# Patient Record
Sex: FEMALE | Race: WHITE
[De-identification: names, ages, dates, MRNs, and addresses within clinical notes are randomized per-mention and may not be internally consistent; named-entity substitution may affect disease eponyms.]

---

## 2018-08-02 ENCOUNTER — HOSPITAL ENCOUNTER (OUTPATIENT)
Dept: HOSPITAL 72 - SUR | Age: 53
Discharge: HOME | End: 2018-08-02
Payer: COMMERCIAL

## 2018-08-02 VITALS — DIASTOLIC BLOOD PRESSURE: 81 MMHG | SYSTOLIC BLOOD PRESSURE: 140 MMHG

## 2018-08-02 VITALS — SYSTOLIC BLOOD PRESSURE: 120 MMHG | DIASTOLIC BLOOD PRESSURE: 79 MMHG

## 2018-08-02 VITALS — WEIGHT: 140 LBS | BODY MASS INDEX: 22.5 KG/M2 | HEIGHT: 66 IN

## 2018-08-02 VITALS — SYSTOLIC BLOOD PRESSURE: 135 MMHG | DIASTOLIC BLOOD PRESSURE: 70 MMHG

## 2018-08-02 VITALS — DIASTOLIC BLOOD PRESSURE: 78 MMHG | SYSTOLIC BLOOD PRESSURE: 129 MMHG

## 2018-08-02 VITALS — SYSTOLIC BLOOD PRESSURE: 133 MMHG | DIASTOLIC BLOOD PRESSURE: 86 MMHG

## 2018-08-02 VITALS — SYSTOLIC BLOOD PRESSURE: 127 MMHG | DIASTOLIC BLOOD PRESSURE: 78 MMHG

## 2018-08-02 VITALS — DIASTOLIC BLOOD PRESSURE: 71 MMHG | SYSTOLIC BLOOD PRESSURE: 136 MMHG

## 2018-08-02 VITALS — SYSTOLIC BLOOD PRESSURE: 118 MMHG | DIASTOLIC BLOOD PRESSURE: 70 MMHG

## 2018-08-02 VITALS — DIASTOLIC BLOOD PRESSURE: 82 MMHG | SYSTOLIC BLOOD PRESSURE: 129 MMHG

## 2018-08-02 VITALS — DIASTOLIC BLOOD PRESSURE: 78 MMHG | SYSTOLIC BLOOD PRESSURE: 128 MMHG

## 2018-08-02 VITALS — SYSTOLIC BLOOD PRESSURE: 132 MMHG | DIASTOLIC BLOOD PRESSURE: 68 MMHG

## 2018-08-02 VITALS — SYSTOLIC BLOOD PRESSURE: 131 MMHG | DIASTOLIC BLOOD PRESSURE: 76 MMHG

## 2018-08-02 DIAGNOSIS — F32.9: ICD-10-CM

## 2018-08-02 DIAGNOSIS — Z85.3: ICD-10-CM
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
## 2018-08-02 DIAGNOSIS — Z88.1: ICD-10-CM
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
## 2018-08-02 DIAGNOSIS — F41.9: ICD-10-CM

## 2018-08-02 DIAGNOSIS — Z88.0: ICD-10-CM

## 2018-08-02 DIAGNOSIS — T85.44XA: Primary | ICD-10-CM

## 2018-08-02 DIAGNOSIS — Z90.710: ICD-10-CM

## 2018-08-02 DIAGNOSIS — Z88.8: ICD-10-CM

## 2018-08-02 LAB
ADD MANUAL DIFF: NO
ANION GAP SERPL CALC-SCNC: 12 MMOL/L (ref 5–15)
APTT BLD: 28 SEC (ref 23–33)
BASOPHILS NFR BLD AUTO: 1.2 % (ref 0–2)
BUN SERPL-MCNC: 21 MG/DL (ref 7–18)
CALCIUM SERPL-MCNC: 9.3 MG/DL (ref 8.5–10.1)
CHLORIDE SERPL-SCNC: 101 MMOL/L (ref 98–107)
CO2 SERPL-SCNC: 27 MMOL/L (ref 21–32)
CREAT SERPL-MCNC: 0.8 MG/DL (ref 0.55–1.3)
EOSINOPHIL NFR BLD AUTO: 2 % (ref 0–3)
ERYTHROCYTE [DISTWIDTH] IN BLOOD BY AUTOMATED COUNT: 11.8 % (ref 11.6–14.8)
HCT VFR BLD CALC: 37.2 % (ref 37–47)
HGB BLD-MCNC: 12.3 G/DL (ref 12–16)
INR PPP: 1.1 (ref 0.9–1.1)
LYMPHOCYTES NFR BLD AUTO: 25.9 % (ref 20–45)
MCV RBC AUTO: 88 FL (ref 80–99)
MONOCYTES NFR BLD AUTO: 8.2 % (ref 1–10)
NEUTROPHILS NFR BLD AUTO: 62.7 % (ref 45–75)
PLATELET # BLD: 244 K/UL (ref 150–450)
POTASSIUM SERPL-SCNC: 4.2 MMOL/L (ref 3.5–5.1)
RBC # BLD AUTO: 4.23 M/UL (ref 4.2–5.4)
SODIUM SERPL-SCNC: 140 MMOL/L (ref 136–145)
WBC # BLD AUTO: 5.7 K/UL (ref 4.8–10.8)

## 2018-08-02 PROCEDURE — 80048 BASIC METABOLIC PNL TOTAL CA: CPT

## 2018-08-02 PROCEDURE — 94003 VENT MGMT INPAT SUBQ DAY: CPT

## 2018-08-02 PROCEDURE — 36415 COLL VENOUS BLD VENIPUNCTURE: CPT

## 2018-08-02 PROCEDURE — 94150 VITAL CAPACITY TEST: CPT

## 2018-08-02 PROCEDURE — 19370 REVJ PERI-IMPLT CAPSULE BRST: CPT

## 2018-08-02 PROCEDURE — 85730 THROMBOPLASTIN TIME PARTIAL: CPT

## 2018-08-02 PROCEDURE — 85610 PROTHROMBIN TIME: CPT

## 2018-08-02 PROCEDURE — 85025 COMPLETE CBC W/AUTO DIFF WBC: CPT

## 2018-08-02 NOTE — IMMEDIATE POST-OP EVALUATION
Immediate Post-Op Evalulation


Immediate Post-Op Evalulation


Procedure:  Right breast capsulotomy


Date of Evaluation:  Aug 2, 2018


Time of Evaluation:  10:06


IV Fluids:  700


Blood Products:  0


Estimated Blood Loss:  min


Urinary Output:  0


Blood Pressure Systolic:  136


Blood Pressure Diastolic:  71


Pulse Rate:  83


Respiratory Rate:  16


O2 Sat by Pulse Oximetry:  99


Temperature (Fahrenheit):  97.1


Pain Score (1-10):  0


Nausea:  No


Vomiting:  No


Complications


0


Patient Status:  awake, reacts, patent, none


Hydration Status:  adequate


Drug:  Clindamcyin 600mg


Given Within 1 Hr of Incision:  Yes


Time Given:  08:50











Cheryl Monge MD Aug 2, 2018 10:05

## 2018-08-02 NOTE — PRE-PROCEDURE NOTE/ATTESTATION
Pre-Procedure Note/Attestation


Complete Prior to Procedure


Planned Procedure:  right


Procedure Narrative:


encapsulation of right breast status post radiation for breast cancer





Indications for Procedure


Pre-Operative Diagnosis:


encapsulation right breast





Attestation


I attest that I discussed the nature of the procedure; its benefits; risks and 

complications; and alternatives (and the risks and benefits of such alternatives

), prior to the procedure, with the patient (or the patient's legal 

representative).





I attest that, if there was a reasonable possibility of needing a blood 

transfusion, the patient (or the patient's legal representative) was given the 

[place] Department of Health Services standardized written summary, pursuant 

to the Daryl Je[place] Blood Safety Act ([place] Health and Safety Code # 1645, as 

amended).





I attest that I re-evaluated the patient just prior to the surgery and that 

there has been no change in the patient's H&P, except as documented below:











Live Amaya MD Aug 2, 2018 08:08

## 2018-08-02 NOTE — ANETHESIA PREOPERATIVE EVAL
Anesthesia Pre-op PMH/ROS


General


Date of Evaluation:  Aug 2, 2018


Anesthesiologist:  [place]


ASA Score:  ASA 3


Mallampati Score


Class I : Soft palate, uvula, fauces, pillars visible


Class II: Soft palate, uvula, fauces visible


Class III: Soft palate, base of uvula visible


Class IV: Only hard plate visible


Mallampati Classification:  Class II


Surgeon:  Jose Luis


Diagnosis:  Right breast radiation


Surgical Procedure:  Right breast capsulotomy


Anesthesia History:  none


Family History:  no anesthesia problems


Allergies:  


Coded Allergies:  


     LEVOFLOXACIN (Verified  Allergy, Severe, 8/1/18)


 THROAT CLOSES UP,HIVES


     PENICILLINS (Verified  Allergy, Severe, 8/1/18)


 THROAT CLOSES UP,HIVES


     BACITRACIN (Verified  Allergy, Intermediate, 8/2/18)


 TOPICAL OINTMENT-ITCHY,HIVES


     NEOMYCIN (Verified  Allergy, Intermediate, 8/2/18)


 TOPICAL OINTMENT-ITCHY,HIVES


     PRAMOXINE (Verified  Allergy, Intermediate, 8/1/18)


 TOPICAL OINTMENT-ITCHY,HIVES


Uncoded Allergies:  


     CEPHALOSPO (Allergy, Severe, 8/1/18)


 THROAT CLOSES UP,HIVES


Medications:  see eMAR





Past Medical History


Cardiovascular:  Denies: HTN, CAD, MI, valve dz, arrhythmia, other


Pulmonary:  Denies: asthma, COPD, CLARISSA, other


Gastrointestinal/Genitourinary:  Denies: GERD, CRI, ESRD, other


Neurologic/Psychiatric:  Reports: depression/anxiety; 


   Denies: dementia, CVA, TIA, other


Endocrine:  Denies: DM, hypothyroidism, steroids, other


HEENT:  Denies: cataract (L), cataract (R), glaucoma, [place] (L), [place] (R), other


Hematology/Immune:  Reports: other - right breast cancer; 


   Denies: anemia, DVT, bleeding disorder


Musculoskeletal/Integumentary:  Denies: OA, RA, DJD, DDD, edema, other


PSxH Narrative:


Bilateral breast augmentation, right breast lumpectomy, tummy tuck, partial 

hysterectomy





Anesthesia Pre-op Phys. Exam


Physician Exam





Last Vital Signs








  Date Time  Temp Pulse Resp B/P (MAP) Pulse Ox O2 Delivery O2 Flow Rate FiO2


 


8/2/18 06:14 96.4 73 20 120/79 (93) 96   





 96.4       


 


8/2/18 06:12      Room Air  








Constitutional:  NAD


Cardiovascular:  RRR


Respiratory:  CTA





Airway Exam


Mallampati Score:  Class II


MO:  full


ROM:  full





Anesthesia Pre-op A/P


Labs





Hematology








Test


  8/2/18


06:35


 


White Blood Count


  5.7 K/UL


(4.8-10.8)


 


Red Blood Count


  4.23 M/UL


(4.20-5.40)


 


Hemoglobin


  12.3 G/DL


(12.0-16.0)


 


Hematocrit


  37.2 %


(37.0-47.0)


 


Mean Corpuscular Volume 88 FL (80-99)  


 


Mean Corpuscular Hemoglobin


  29.1 PG


(27.0-31.0)


 


Mean Corpuscular Hemoglobin


Concent 33.1 G/DL


(32.0-36.0)


 


Red Cell Distribution Width


  11.8 %


(11.6-14.8)


 


Platelet Count


  244 K/UL


(150-450)


 


Mean Platelet Volume


  7.1 FL


(6.5-10.1)


 


Neutrophils (%) (Auto)


  62.7 %


(45.0-75.0)


 


Lymphocytes (%) (Auto)


  25.9 %


(20.0-45.0)


 


Monocytes (%) (Auto)


  8.2 %


(1.0-10.0)


 


Eosinophils (%) (Auto)


  2.0 %


(0.0-3.0)


 


Basophils (%) (Auto)


  1.2 %


(0.0-2.0)








Coagulation








Test


  8/2/18


06:35


 


Prothrombin Time Pending  


 


Prothromb Time International


Ratio Pending  


 


 


Activated Partial


Thromboplast Time Pending  


 








Chemistry








Test


  8/2/18


06:35


 


Sodium Level


  140 MMOL/L


(136-145)


 


Potassium Level


  4.2 MMOL/L


(3.5-5.1)


 


Chloride Level


  101 MMOL/L


()


 


Carbon Dioxide Level


  27 MMOL/L


(21-32)


 


Anion Gap


  12 mmol/L


(5-15)


 


Blood Urea Nitrogen


  21 mg/dL


(7-18)  H


 


Creatinine


  0.8 MG/DL


(0.55-1.30)


 


Estimat Glomerular Filtration


Rate > 60 mL/min


(>60)


 


Glucose Level


  97 MG/DL


()


 


Calcium Level


  9.3 MG/DL


(8.5-10.1)











Studies


Pre-op Studies:  EKG - sr





Risk Assessment & Plan


Assessment:


ASA III


Plan:


GA


Status Change Before Surgery:  No





Pre-Antibiotics


Drug:  Cheryl Carpio MD Aug 2, 2018 07:18

## 2018-08-02 NOTE — HISTORY AND PHYSICAL
History & Physical (DB)


History & Physical


History & Physical





Chief Complaint: encapsulation of right breast status post breast aug below the 

muscle status post radation for breast cancer





Reason for Hospitalizto do open capsulotomy ofright breast


ation:  





History obtained from: Chart and Patient








HPI:  is a  year old female who presents with .











Past Medical History:   prior radation for breast cancer








Social History:negneg








Past Medical History:prior radation for breast cancer








Diagnosis:   encapsulation right breast with deformity and painbreast aug 

bilateral  4 weeks ago








Past Surgical History:status postbilateral  breast aug








Social History:








Occupational History:





Social History Main Topics:





Smoking status:





Smokeless tobacco:





Alcohol use:





Drug use:





Sexual activity:





Partners:





Birth control/ protection:





Family History:





Allergies:


   








Medications:





Review of Symptoms:





General ROS: no weight loss or fever


Psychological ROS: no depression or mood changes, no memory loss


Ophthalmic ROS: no visual changes or eye irritation


ENT ROS: no nasal congestion, hearing loss, dizziness


Allergy and Immunology ROS: no allergic symptoms or urticaria


Hematological and Lymphatic ROS: no swollen glands, unusual bleeding or bruising


Endocrine ROS: no polyuria, polydipsia, weight changes, temperature intolerance


Respiratory ROS: no cough, shortness of breath, or wheezing


Cardiovascular ROS: no chest pain or dyspnea on exertion


Gastrointestinal ROS: no abdominal pain, change in bowel habits, or black or 

bloody stools


Musculoskeletal ROS: no myalgias or arthralgias


Neurological ROS: no TIA or stroke symptoms


Dermatological ROS: no new or changing skin lesions, rashes or pruritis














Physical Exam





Vitals: 





Intake/Output Summary (Last 24 hours) 





General appearance:  alert, cooperative, no distress, appears stated age


Head:  Normocephalic, without obvious abnormality, atraumatic


Eyes:  conjunctivae/corneas clear. PERRL, EOM's intact. 


Throat:  Lips, mucosa, and tongue normal. Teeth and gums normal


Neck:  supple, symmetrical, trachea midline, no adenopathy, thyroid: not 

enlarged, symmetric, no tenderness/mass/nodules, no carotid bruit and no JVD


Lungs:  clear to auscultation bilaterally


Heart:  regular rate and rhythm, S1, S2 normal, no murmur, click, rub or gallop


Abdomen:  soft, non-tender. Bowel sounds normal. No masses,  no organomegaly


Extremities:  extremities normal, atraumatic, no cyanosis or edema


Pulses:  2+ and symmetric


Skin:  Skin color, texture, turgor normal. No rashes or lesions


Neurologic:  Grossly normal








Laboratories:





Estimated:





Imaging and ancillary data





Assessment/Problem List:   








Plan:





   


DVT Prophylaxis: scd


Code status: full


Hospital Classification declaration: Based on this initial evaluation, and 

depending on the patient's clinical course, I anticipate that this patient will 

require hospitalization for 2-3 days. 


Disposition: Once the patient is stable to leave the hospital, I anticipate the 

patient will likely be discharged to the following environment





I spent 70 minutes on this patient's case, and  minutes was dedicated to 

counseling and/or care coordination. Case was discussed with 





Time of note may not reflect time of encounter.











Live Amaya MD Aug 2, 2018 08:02

## 2018-08-02 NOTE — 48 HOUR POST ANESTHESIA EVAL
Post Anesthesia Evaluation


Procedure:  Right breast capsulotomy


Date of Evaluation:  Aug 2, 2018


Airway:  patent


Nausea:  No


Vomiting:  No


Pain Intensity:  0


Hydration Status:  adequate


Cardiopulmonary Status:


at baseline


Mental Status/LOC:  patient returned to baseline


Post-Anesthesia Complications:


0


Follow-up care needed:  ready to discharge











Cheryl Monge MD Aug 2, 2018 10:05

## 2018-08-09 NOTE — OPERATIVE NOTE - DICTATED
DATE OF OPERATION:  08/02/2018



SURGEON:  Live Amaya M.D.



ASSISTANT:  None.



ANESTHESIOLOGIST:  Cheryl Monge M.D.



PREOPERATIVE DIAGNOSIS:  Status post radiation to the right breast and

status post breast augmentation done a few weeks ago.  The patient is

encapsulated and has pain and upward placement of the implant.  The

patient was shown pictures from the book and was aware of the risks,

complications, and alternative methods of treatment.  The patient had

surgery also on the opposite side, but without radiation and was done with

implant underneath the muscle.  The patient is very happy with that, but

because of the radiation and sloughed tissue at different levels, she was

unhappy with the right breast implant.  Purpose of the surgery was to

lower the position of the implant and to do a capsulotomy and also to cut

the capsule and the muscle vertically in a dorsal cephalad position to

allow for the pocket, did not have a restriction from right to left.



DESCRIPTION OF PROCEDURE:  The patient was brought to the operating table.

In the OR was given general inhalation anesthesia and was prepped in the

usual manner for surgery, was injected with 0.25% Xylocaine 1:400,000

epinephrine with a spinal needle and then a periareolar incision was made.

The incision was made down to the capsule of the implant.  We then in

that pocket through the capsule did a dissection deep right to left and

down approximately maybe 2 inches.  We then did the dissection above the

capsule and saw the capsule and the muscle and split that in two and just by

splitting it, it .  At that point, the implant dropped into each pocket
,

we did not have to take out the implant.  We then sutured the capsule.  Actually
, 

we kept the vertical incision open and cauterized the bleeders and then put a

drain, [place].  We then closed the skin using 3-0 Vicryl in the

breast tissue and also in the dermis and then we used a continuous running

5-0 blue Prolene in the skin.  The drain was sutured using a 3-0 Vicryl.

The patient was then extubated and transferred to the postop recovery

facility in satisfactory condition.



ESTIMATED BLOOD LOSS:  Less than 10 mL.









  ______________________________________________

  Live Amaya M.D.





DR:  FATOUMATA

D:  08/08/2018 18:04

T:  08/09/2018 00:38

JOB#:  [id number]

CC:



ALLI

## 2018-08-13 ENCOUNTER — HOSPITAL ENCOUNTER (OUTPATIENT)
Dept: HOSPITAL 72 - SUR | Age: 53
Discharge: HOME | End: 2018-08-13
Payer: COMMERCIAL

## 2018-08-13 VITALS — SYSTOLIC BLOOD PRESSURE: 104 MMHG | DIASTOLIC BLOOD PRESSURE: 68 MMHG

## 2018-08-13 VITALS — SYSTOLIC BLOOD PRESSURE: 108 MMHG | DIASTOLIC BLOOD PRESSURE: 63 MMHG

## 2018-08-13 VITALS — HEIGHT: 66 IN | WEIGHT: 140 LBS | BODY MASS INDEX: 22.5 KG/M2

## 2018-08-13 VITALS — SYSTOLIC BLOOD PRESSURE: 101 MMHG | DIASTOLIC BLOOD PRESSURE: 59 MMHG

## 2018-08-13 VITALS — DIASTOLIC BLOOD PRESSURE: 59 MMHG | SYSTOLIC BLOOD PRESSURE: 109 MMHG

## 2018-08-13 VITALS — DIASTOLIC BLOOD PRESSURE: 68 MMHG | SYSTOLIC BLOOD PRESSURE: 108 MMHG

## 2018-08-13 VITALS — SYSTOLIC BLOOD PRESSURE: 107 MMHG | DIASTOLIC BLOOD PRESSURE: 63 MMHG

## 2018-08-13 VITALS — DIASTOLIC BLOOD PRESSURE: 75 MMHG | SYSTOLIC BLOOD PRESSURE: 112 MMHG

## 2018-08-13 VITALS — SYSTOLIC BLOOD PRESSURE: 105 MMHG | DIASTOLIC BLOOD PRESSURE: 59 MMHG

## 2018-08-13 VITALS — SYSTOLIC BLOOD PRESSURE: 106 MMHG | DIASTOLIC BLOOD PRESSURE: 61 MMHG

## 2018-08-13 DIAGNOSIS — N65.1: Primary | ICD-10-CM

## 2018-08-13 DIAGNOSIS — Z90.710: ICD-10-CM

## 2018-08-13 DIAGNOSIS — Z88.1: ICD-10-CM

## 2018-08-13 DIAGNOSIS — Z85.3: ICD-10-CM

## 2018-08-13 DIAGNOSIS — Z88.0: ICD-10-CM

## 2018-08-13 DIAGNOSIS — F32.9: ICD-10-CM

## 2018-08-13 DIAGNOSIS — Z88.8: ICD-10-CM

## 2018-08-13 DIAGNOSIS — F41.9: ICD-10-CM

## 2018-08-13 PROCEDURE — 94150 VITAL CAPACITY TEST: CPT

## 2018-08-13 PROCEDURE — 19316 MASTOPEXY: CPT

## 2018-08-13 PROCEDURE — 94003 VENT MGMT INPAT SUBQ DAY: CPT

## 2018-08-13 NOTE — PRE-PROCEDURE NOTE/ATTESTATION
Pre-Procedure Note/Attestation


Complete Prior to Procedure


Planned Procedure:  left


Procedure Narrative:


status post radiation of right breast has contraction had prior open 

capsulotomy rigt side status post breast aug now needs a verticle mastopexy of 

left side





Indications for Procedure


Pre-Operative Diagnosis:


has elongation of breast compared to the right breast





Attestation


I attest that I discussed the nature of the procedure; its benefits; risks and 

complications; and alternatives (and the risks and benefits of such alternatives

), prior to the procedure, with the patient (or the patient's legal 

representative).





I attest that, if there was a reasonable possibility of needing a blood 

transfusion, the patient (or the patient's legal representative) was given the 

[place] Department of Health Services standardized written summary, pursuant 

to the [place] Blood Safety Act ([place] Health and Safety Code # 1645, as 

amended).





I attest that I re-evaluated the patient just prior to the surgery and that 

there has been no change in the patient's H&P, except as documented below:











Live Amaya MD Aug 13, 2018 07:59

## 2018-08-13 NOTE — ANETHESIA PREOPERATIVE EVAL
Anesthesia Pre-op PMH/ROS


General


Date of Evaluation:  Aug 13, 2018


Anesthesiologist:  [place]


ASA Score:  ASA 3


Mallampati Score


Class I : Soft palate, uvula, fauces, pillars visible


Class II: Soft palate, uvula, fauces visible


Class III: Soft palate, base of uvula visible


Class IV: Only hard plate visible


Mallampati Classification:  Class II


Surgeon:  Jose Luis


Diagnosis:  Right breast cancer


Surgical Procedure:  Left breast mastopexy


Anesthesia History:  none


Family History:  no anesthesia problems


Allergies:  


Coded Allergies:  


     LEVOFLOXACIN (Verified  Allergy, Severe, 8/1/18)


 THROAT CLOSES UP,HIVES


     PENICILLINS (Verified  Allergy, Severe, 8/1/18)


 THROAT CLOSES UP,HIVES


     BACITRACIN (Verified  Allergy, Intermediate, 8/2/18)


 TOPICAL OINTMENT-ITCHY,HIVES


     NEOMYCIN (Verified  Allergy, Intermediate, 8/2/18)


 TOPICAL OINTMENT-ITCHY,HIVES


     PRAMOXINE (Verified  Allergy, Intermediate, 8/1/18)


 TOPICAL OINTMENT-ITCHY,HIVES


Uncoded Allergies:  


     CEPHALOSPO (Allergy, Severe, 8/1/18)


 THROAT CLOSES UP,HIVES


Medications:  see eMAR





Past Medical History


Cardiovascular:  Denies: HTN, CAD, MI, valve dz, arrhythmia, other


Pulmonary:  Denies: asthma, COPD, CLARISSA, other


Gastrointestinal/Genitourinary:  Denies: GERD, CRI, ESRD, other


Neurologic/Psychiatric:  Reports: depression/anxiety; 


   Denies: dementia, CVA, TIA, other


Endocrine:  Denies: DM, hypothyroidism, steroids, other


HEENT:  Denies: cataract (L), cataract (R), glaucoma, [place] (L), [place] (R), other


Hematology/Immune:  Denies: anemia, DVT, bleeding disorder, other


Musculoskeletal/Integumentary:  Denies: OA, RA, DJD, DDD, edema, other


PSxH Narrative:


partial hysterectomy, multiple breast sx, T&A, lasik





Anesthesia Pre-op Phys. Exam


Physician Exam





Last Vital Signs








  Date Time  Temp Pulse Resp B/P (MAP) Pulse Ox O2 Delivery O2 Flow Rate FiO2


 


8/13/18 06:25      Room Air  


 


8/13/18 06:20 97.2 71 18 112/75 (87) 100   





 97.2       








Constitutional:  NAD


Cardiovascular:  RRR


Respiratory:  CTA





Airway Exam


Mallampati Score:  Class II


MO:  full


ROM:  full





Anesthesia Pre-op A/P


Labs


see chart





Studies


Pre-op Studies:  EKG - sr





Risk Assessment & Plan


Assessment:


ASA III


Plan:


GA


Status Change Before Surgery:  No





Pre-Antibiotics


Drug:  Clindamycin 600mg


Given Within 1 Hr of Incision:  Yes











Cheryl Monge MD Aug 13, 2018 07:38

## 2018-08-13 NOTE — IMMEDIATE POST-OP EVALUATION
Immediate Post-Op Evalulation


Immediate Post-Op Evalulation


Procedure:  Left breast mastopexy


Date of Evaluation:  Aug 13, 2018


Time of Evaluation:  09:17


IV Fluids:  800


Blood Products:  0


Estimated Blood Loss:  min


Urinary Output:  0


Blood Pressure Systolic:  109


Blood Pressure Diastolic:  59


Pulse Rate:  60


Respiratory Rate:  16


O2 Sat by Pulse Oximetry:  100


Temperature (Fahrenheit):  97.5


Pain Score (1-10):  0


Nausea:  No


Vomiting:  No


Complications


0


Patient Status:  awake, reacts, patent, none


Hydration Status:  adequate


Drug:  Clindamycin 600mg


Given Within 1 Hr of Incision:  Yes


Time Given:  08:00











Cheryl Monge MD Aug 13, 2018 09:14

## 2018-08-13 NOTE — OPERATIVE NOTE - PDOC
Operative Note


Operative Note


Pre-op Diagnosis:


has elongation of breast compared to the right breast


Post-op Diagnosis:  same as pre-op


Specimen:  yes


Complications:  none


Condition:  stable


Estimated Blood Loss:  minimal


Drains:  none


Implant(s) used?:  No











Live Amaya MD Aug 13, 2018 09:16

## 2018-08-13 NOTE — 48 HOUR POST ANESTHESIA EVAL
Post Anesthesia Evaluation


Procedure:  Left breast mastopexy


Date of Evaluation:  Aug 13, 2018


Airway:  patent


Nausea:  No


Vomiting:  No


Pain Intensity:  0


Hydration Status:  adequate


Cardiopulmonary Status:


at baseline


Mental Status/LOC:  patient returned to baseline


Post-Anesthesia Complications:


0


Follow-up care needed:  ready to discharge











Cheryl Monge MD Aug 13, 2018 09:15

## 2018-08-16 NOTE — OPERATIVE NOTE - DICTATED
DATE OF OPERATION:  08/13/2018



SURGEON:  Live Amaya M.D.



ANESTHESIOLOGIST:  Cheryl Monge M.D.



PREOPERATIVE DIAGNOSIS:  Status post radiation for cancer of the right

breast with submuscular implants done several weeks ago and the patient

had a prior removal of anterior capsule, which was restricted in the right

breast with deepening of the capsule inferiorly to allow for the implant

on the right side to drop into the pocket as a result of scar tissue

through many levels of tissue from prior radiation.  Following this, the

left breast looked too long and droopy and was asymmetrical to the right

breast.  The right breast is now looking good from the extra surgery that

she had, which included the incisions of the anterior capsule and

deepening of the inferior capsule.  So, the patient needed a vertical

mastopexy of the left breast.  This was to be done under inhalation

anesthesia.  The patient saw many photographs and was aware of the risks,

complications, and alternative methods of treatment.  The goal was to

reduce the size of the areola and lift the nipple-areolar complex and make

the breast smaller to match the right breast. 



DESCRIPTION OF PROCEDURE:  We began by prepping the patient and

we then injected with 0.25% Xylocaine and 1:400,000 of epinephrine

and with the markings already done, we made the 15 blade incision

through the markings and excised superior and cephalad skin to the

nipple-areolar complex and broaden the incisions to make a T.  This is

a lollipop incision.  We closed all incisions using 3-0 Vicryl and then

closed the skin using a continuous running 4-0 blue Prolene.  The nipple

areolar complex looked good at the end of the procedure.  The breast was

higher and in direct alignment to the right breast.  The patient had less

than 10 mL of blood loss.  The patient was then extubated and transferred

to the postop recovery facility in satisfactory condition.









  ______________________________________________

  Live Amaya M.D.





DR:  ANURADHA

D:  08/16/2018 17:54

T:  08/16/2018 23:12

JOB#:  [id number]

CC:



ALLI

## 2018-08-30 ENCOUNTER — HOSPITAL ENCOUNTER (OUTPATIENT)
Dept: HOSPITAL 72 - SUR | Age: 53
Discharge: HOME | End: 2018-08-30
Payer: COMMERCIAL

## 2018-08-30 VITALS — SYSTOLIC BLOOD PRESSURE: 117 MMHG | DIASTOLIC BLOOD PRESSURE: 76 MMHG

## 2018-08-30 VITALS — SYSTOLIC BLOOD PRESSURE: 107 MMHG | DIASTOLIC BLOOD PRESSURE: 63 MMHG

## 2018-08-30 VITALS — DIASTOLIC BLOOD PRESSURE: 64 MMHG | SYSTOLIC BLOOD PRESSURE: 114 MMHG

## 2018-08-30 VITALS — HEIGHT: 66 IN | WEIGHT: 140 LBS | BODY MASS INDEX: 22.5 KG/M2

## 2018-08-30 VITALS — DIASTOLIC BLOOD PRESSURE: 72 MMHG | SYSTOLIC BLOOD PRESSURE: 120 MMHG

## 2018-08-30 VITALS — SYSTOLIC BLOOD PRESSURE: 112 MMHG | DIASTOLIC BLOOD PRESSURE: 64 MMHG

## 2018-08-30 VITALS — SYSTOLIC BLOOD PRESSURE: 112 MMHG | DIASTOLIC BLOOD PRESSURE: 70 MMHG

## 2018-08-30 VITALS — SYSTOLIC BLOOD PRESSURE: 114 MMHG | DIASTOLIC BLOOD PRESSURE: 78 MMHG

## 2018-08-30 DIAGNOSIS — N65.1: Primary | ICD-10-CM

## 2018-08-30 DIAGNOSIS — F32.9: ICD-10-CM

## 2018-08-30 DIAGNOSIS — Z88.0: ICD-10-CM

## 2018-08-30 DIAGNOSIS — Z88.1: ICD-10-CM

## 2018-08-30 DIAGNOSIS — F41.9: ICD-10-CM

## 2018-08-30 DIAGNOSIS — Z85.3: ICD-10-CM

## 2018-08-30 DIAGNOSIS — R01.1: ICD-10-CM

## 2018-08-30 DIAGNOSIS — Z88.8: ICD-10-CM

## 2018-08-30 PROCEDURE — 19316 MASTOPEXY: CPT

## 2018-08-30 PROCEDURE — 94003 VENT MGMT INPAT SUBQ DAY: CPT

## 2018-08-30 PROCEDURE — 94150 VITAL CAPACITY TEST: CPT

## 2018-08-30 NOTE — ANETHESIA PREOPERATIVE EVAL
Anesthesia Pre-op PMH/ROS


General


Date of Evaluation:  Aug 30, 2018


Time of Evaluation:  09:00


Anesthesiologist:  Sofya Guzmán CRNA


ASA Score:  ASA 2


Mallampati Score


Class I : Soft palate, uvula, fauces, pillars visible


Class II: Soft palate, uvula, fauces visible


Class III: Soft palate, base of uvula visible


Class IV: Only hard plate visible


Mallampati Classification:  Class I


Surgeon:  Jose Luis


Diagnosis:  s/p breast augmentation


Surgical Procedure:  Horizontal lift of LEFT Breast


Anesthesia History:  PONV


Allergies:  


Coded Allergies:  


     CEPHALOSPORINS (Verified  Allergy, Severe, 8/29/18)


 THROAT CLOSES UP,HIVES


     LEVOFLOXACIN (Verified  Allergy, Severe, 8/1/18)


 THROAT CLOSES UP,HIVES


     PENICILLINS (Verified  Allergy, Severe, 8/1/18)


 THROAT CLOSES UP,HIVES


     BACITRACIN (Verified  Allergy, Intermediate, 8/2/18)


 TOPICAL OINTMENT-ITCHY,HIVES


     NEOMYCIN (Verified  Allergy, Intermediate, 8/2/18)


 TOPICAL OINTMENT-ITCHY,HIVES


     PRAMOXINE (Verified  Allergy, Intermediate, 8/1/18)


 TOPICAL OINTMENT-ITCHY,HIVES


Medications:  see eMAR





Past Medical History


Cardiovascular:  Reports: other - asymptomatic murmur; 


   Denies: HTN, CAD, MI, valve dz, arrhythmia


Pulmonary:  Denies: asthma, COPD, [place], other


Gastrointestinal/Genitourinary:  Denies: GERD, CRI, ESRD, other


Neurologic/Psychiatric:  Reports: depression/anxiety - on xanax


Endocrine:  Denies: DM, hypothyroidism, steroids, other


Hematology/Immune:  Denies: anemia, DVT, bleeding disorder, other


Musculoskeletal/Integumentary:  Reports: other - Breast CA s/p lumpectomy and 

radiation treatment; 


   Denies: OA, RA, DJD, DDD, edema


PMH Narrative:


53 yo female with prior medical history of breast CA s/p lumpectomy and 

radiation treatment; multiple drug allergies, no prior anesthetic complications


PSxH Narrative:


multiple cosmetic surgeries, s/p lumpectomy for breast CA





Anesthesia Pre-op Phys. Exam


Physician Exam





Last Vital Signs








  Date Time  Temp Pulse Resp B/P (MAP) Pulse Ox O2 Delivery O2 Flow Rate FiO2


 


8/30/18 08:50 97.1 75 18 114/78 (90) 99   





 97.1       








Constitutional:  NAD


Neurologic:  CN 2-12 intact


Cardiovascular:  RRR


Respiratory:  CTA


Gastrointestinal:  S/NT/ND





Airway Exam


Mallampati Score:  Class I


MO:  full


TMD:  > 3 FB


ROM:  full


Teeth:  intact, other - lower permanent retainer


Dentures:  no upper, no lower





Anesthesia Pre-op A/P


Risk Assessment & Plan


Assessment:


52 ye female s/p breast augmentation requiring left horizontal lift, (+) anxiety


Plan:


MAC


Status Change Before Surgery:  Sofya Dunn CRNA Aug 30, 2018 09:03

## 2018-08-30 NOTE — OPERATIVE NOTE - PDOC
Operative Note


Operative Note


Post-op Diagnosis:  same as pre-op


Specimen:  yes


Complications:  none


Condition:  stable


Estimated Blood Loss:  minimal


Drains:  none


Implant(s) used?:  No











Live Amaya MD Aug 30, 2018 11:51

## 2018-08-30 NOTE — PRE-PROCEDURE NOTE/ATTESTATION
Pre-Procedure Note/Attestation


Complete Prior to Procedure


Planned Procedure:  left


Procedure Narrative:


excision of verticle and horizontal skin left breast





Attestation


I attest that I discussed the nature of the procedure; its benefits; risks and 

complications; and alternatives (and the risks and benefits of such alternatives

), prior to the procedure, with the patient (or the patient's legal 

representative).





I attest that, if there was a reasonable possibility of needing a blood 

transfusion, the patient (or the patient's legal representative) was given the 

[facility] standardized written summary, pursuant 

to the [place] Blood Safety Act ([place] Health and Safety Code # 1645, as 

amended).





I attest that I re-evaluated the patient just prior to the surgery and that 

there has been no change in the patient's H&P, except as documented below:











Live Amaya MD Aug 30, 2018 11:49

## 2018-08-30 NOTE — IMMEDIATE POST-OP EVALUATION
Immediate Post-Op Evalulation


Immediate Post-Op Evalulation


Procedure:  LEFT breast horizontal lift


Date of Evaluation:  Aug 30, 2018


Time of Evaluation:  11:44


IV Fluids:   ml


Estimated Blood Loss:  minimal


Blood Pressure Systolic:  107


Blood Pressure Diastolic:  63


Pulse Rate:  75


Respiratory Rate:  21


O2 Sat by Pulse Oximetry:  98


Temperature (Fahrenheit):  98.2


Nausea:  No


Vomiting:  No


Complications


none noted


Patient Status:  awake, reacts, patent


Hydration Status:  adequate











Sofya Guzmán CRNA Aug 30, 2018 11:52

## 2018-08-31 VITALS — DIASTOLIC BLOOD PRESSURE: 73 MMHG | SYSTOLIC BLOOD PRESSURE: 120 MMHG

## 2018-08-31 NOTE — 48 HOUR POST ANESTHESIA EVAL
Post Anesthesia Evaluation


Procedure:  LEFT breast horizontal lift


Date of Evaluation:  Aug 30, 2018


Time of Evaluation:  12:30


Blood Pressure Systolic:  120


0:  73


Pulse Rate:  68


Respiratory Rate:  18


Temperature (Fahrenheit):  98.0


O2 Sat by Pulse Oximetry:  99


Airway:  patent


Nausea:  No


Vomiting:  No


Pain Intensity:  0


Hydration Status:  adequate


Mental Status/LOC:  patient returned to baseline


Follow-up Care/Observations:


none


Post-Anesthesia Complications:


none noted


Follow-up care needed:  ready to discharge











Sofya Guzmán CRNA Aug 31, 2018 10:59

## 2018-09-06 NOTE — OPERATIVE NOTE - DICTATED
DATE OF OPERATION:  08/30/2018



SURGEON:  Live Amaya M.D.



PREOPERATIVE DIAGNOSES:  Status post radiation to the right breast with

augmentation done beneath the muscle and prior surgeries of the left side

to match the right, the vertical mastopexy done in the past and prior

breast implantation using silicone gel implants.



The patient was shown many photographs in the breast augmentation book and

was aware of the risks, complications, and alternative methods of

treatment.  The patient consented with realistic expectations.  The

patient had an elongation of the left breast compared to the right and

wanted it to be lifted up and shortened.  The nipple-areolar complex was

in the right place, but it was too much bottoming out with a vertical lift

and so we needed to do the horizontal component of the Wise pattern and I

incorporated also a small portion of the vertical limb to create _____ to

get more projection of the breast and narrowing of the breast and left at

the same time.  The areas in question were injected with 0.25% Xylocaine

1:400,000 of epinephrine and then it was prepped first and then injected

and then we took the #10 blade and excised along the margins of the

horizontal limb and vertical limb.  We took out the skin, subcutaneous

tissue and breast tissue and closed it using 2-0 and 3-0 Vicryl sutures

interrupted in the dermis and then a continuous running 4-0 blue Prolene

in the vertical as well as horizontal component.  A 4x4's and bacitracin

was applied to the wound and the patient was transferred to the

postoperative recovery facility in satisfactory condition.  There was less

than 10 mL blood loss.









  ______________________________________________

  Live Amaya M.D.





DR:  FATOUMATA

D:  09/06/2018 09:07

T:  09/06/2018 17:04

JOB#:  [id number]

CC:

## 2018-09-27 ENCOUNTER — HOSPITAL ENCOUNTER (OUTPATIENT)
Dept: HOSPITAL 72 - SUR | Age: 53
Discharge: HOME | End: 2018-09-27
Payer: COMMERCIAL

## 2018-09-27 VITALS — DIASTOLIC BLOOD PRESSURE: 65 MMHG | SYSTOLIC BLOOD PRESSURE: 109 MMHG

## 2018-09-27 VITALS — DIASTOLIC BLOOD PRESSURE: 69 MMHG | SYSTOLIC BLOOD PRESSURE: 101 MMHG

## 2018-09-27 VITALS — DIASTOLIC BLOOD PRESSURE: 64 MMHG | SYSTOLIC BLOOD PRESSURE: 106 MMHG

## 2018-09-27 VITALS — DIASTOLIC BLOOD PRESSURE: 67 MMHG | SYSTOLIC BLOOD PRESSURE: 112 MMHG

## 2018-09-27 VITALS — WEIGHT: 140 LBS | BODY MASS INDEX: 22.5 KG/M2 | HEIGHT: 66 IN

## 2018-09-27 VITALS — DIASTOLIC BLOOD PRESSURE: 72 MMHG | SYSTOLIC BLOOD PRESSURE: 116 MMHG

## 2018-09-27 VITALS — DIASTOLIC BLOOD PRESSURE: 61 MMHG | SYSTOLIC BLOOD PRESSURE: 115 MMHG

## 2018-09-27 VITALS — SYSTOLIC BLOOD PRESSURE: 115 MMHG | DIASTOLIC BLOOD PRESSURE: 68 MMHG

## 2018-09-27 VITALS — DIASTOLIC BLOOD PRESSURE: 56 MMHG | SYSTOLIC BLOOD PRESSURE: 114 MMHG

## 2018-09-27 VITALS — SYSTOLIC BLOOD PRESSURE: 111 MMHG | DIASTOLIC BLOOD PRESSURE: 63 MMHG

## 2018-09-27 VITALS — DIASTOLIC BLOOD PRESSURE: 61 MMHG | SYSTOLIC BLOOD PRESSURE: 108 MMHG

## 2018-09-27 VITALS — SYSTOLIC BLOOD PRESSURE: 110 MMHG | DIASTOLIC BLOOD PRESSURE: 66 MMHG

## 2018-09-27 DIAGNOSIS — F32.9: ICD-10-CM

## 2018-09-27 DIAGNOSIS — Z90.710: ICD-10-CM

## 2018-09-27 DIAGNOSIS — Z88.1: ICD-10-CM

## 2018-09-27 DIAGNOSIS — F41.9: ICD-10-CM

## 2018-09-27 DIAGNOSIS — K21.9: ICD-10-CM

## 2018-09-27 DIAGNOSIS — Z88.8: ICD-10-CM

## 2018-09-27 DIAGNOSIS — C50.911: Primary | ICD-10-CM

## 2018-09-27 PROCEDURE — 94150 VITAL CAPACITY TEST: CPT

## 2018-09-27 PROCEDURE — 94003 VENT MGMT INPAT SUBQ DAY: CPT

## 2018-09-27 PROCEDURE — 19328 RMVL INTACT BREAST IMPLANT: CPT

## 2018-09-27 PROCEDURE — 19340 INSJ BREAST IMPLT SM D MAST: CPT

## 2018-09-27 NOTE — BRIEF OPERATIVE NOTE
Immediate Post Operative Note


Operative Note


Pre-op Diagnosis:


status radiation for breast cancer and wanting symmetry


Procedure:


remove implant from under muscle to place new bigger implant to over the muscle


Post-op Diagnosis:  same as pre-op


Surgeon:  ra


Anesthesia:  general


Specimen:  yes


Complications:  none


Condition:  stable


Fluids:  none


Estimated Blood Loss:  minimal


Drains:  AUDI


Packing:  none


Implant(s) used?:  Yes











Live Amaya MD Sep 27, 2018 10:58

## 2018-09-27 NOTE — IMMEDIATE POST-OP EVALUATION
Immediate Post-Op Evalulation


Immediate Post-Op Evalulation


Procedure:  removal and replacement of R breast implant


Date of Evaluation:  Sep 27, 2018


Time of Evaluation:  10:58


IV Fluids:  800


Blood Products:  none


Estimated Blood Loss:  50


Urinary Output:  none


Blood Pressure Systolic:  115


Blood Pressure Diastolic:  68


Pulse Rate:  65


Respiratory Rate:  20


O2 Sat by Pulse Oximetry:  99


Temperature (Fahrenheit):  97.8


Pain Score (1-10):  1


Nausea:  No


Vomiting:  No


Complications


none


Patient Status:  awake, patent, none


Hydration Status:  adequate











Bj Hammond MD Sep 27, 2018 10:59

## 2018-09-27 NOTE — 48 HOUR POST ANESTHESIA EVAL
Post Anesthesia Evaluation


Procedure:  removal and replacement of R breast implant


Date of Evaluation:  Sep 27, 2018


Time of Evaluation:  12:38


Blood Pressure Systolic:  116


0:  72


Pulse Rate:  72


Respiratory Rate:  20


Temperature (Fahrenheit):  97.5


O2 Sat by Pulse Oximetry:  98


Airway:  patent


Nausea:  No


Vomiting:  No


Pain Intensity:  2


Hydration Status:  adequate


Cardiopulmonary Status:


stable


Mental Status/LOC:  patient returned to baseline


Follow-up Care/Observations:


n/a


Post-Anesthesia Complications:


none


Follow-up care needed:  ready to discharge











Bj Hammond MD Sep 27, 2018 12:40

## 2018-09-27 NOTE — ANETHESIA PREOPERATIVE EVAL
Anesthesia Pre-op PMH/ROS


General


Date of Evaluation:  Sep 27, 2018


Time of Evaluation:  09:20


Anesthesiologist:  Manish


ASA Score:  ASA 2


Mallampati Score


Class I : Soft palate, uvula, fauces, pillars visible


Class II: Soft palate, uvula, fauces visible


Class III: Soft palate, base of uvula visible


Class IV: Only hard plate visible


Mallampati Classification:  Class II


Surgeon:  Erica


Diagnosis:  R breast CA


Surgical Procedure:  Removal and replacement of R breast implant


Anesthesia History:  PONV


Allergies:  


Coded Allergies:  


     CEPHALOSPORINS (Verified  Allergy, Severe, 8/29/18)


 THROAT CLOSES UP,HIVES


     LEVOFLOXACIN (Verified  Allergy, Severe, 8/1/18)


 THROAT CLOSES UP,HIVES


     PENICILLINS (Verified  Allergy, Severe, 8/1/18)


 THROAT CLOSES UP,HIVES


     BACITRACIN (Verified  Allergy, Intermediate, 8/2/18)


 TOPICAL OINTMENT-ITCHY,HIVES


     NEOMYCIN (Verified  Allergy, Intermediate, 8/2/18)


 TOPICAL OINTMENT-ITCHY,HIVES


     PRAMOXINE (Verified  Allergy, Intermediate, 8/1/18)


 TOPICAL OINTMENT-ITCHY,HIVES





Past Medical History


Cardiovascular:  Denies: HTN, CAD, MI, valve dz, arrhythmia, other


Pulmonary:  Denies: asthma, COPD, CLARISSA, other


Gastrointestinal/Genitourinary:  Reports: GERD; 


   Denies: CRI, ESRD, other


Neurologic/Psychiatric:  Reports: depression/anxiety; 


   Denies: dementia, CVA, TIA, other


Endocrine:  Denies: DM, hypothyroidism, steroids, other


HEENT:  Denies: cataract (L), cataract (R), glaucoma, [place]opda (L), Mechoopda (R), other


Hematology/Immune:  Denies: anemia, DVT, bleeding disorder, other


Musculoskeletal/Integumentary:  Denies: OA, RA, DJD, DDD, edema, other


PMH Narrative:


as above


PSxH Narrative:


R breast partial mastectomy and reconstruction, hysterectomy, abdominoplasty





Anesthesia Pre-op Phys. Exam


Physician Exam





Last Vital Signs








  Date Time  Temp Pulse Resp B/P (MAP) Pulse Ox O2 Delivery O2 Flow Rate FiO2


 


9/27/18 07:38 97.6 63 20 114/56 (75) 100   





 97.6       


 


9/27/18 07:28      Room Air  








Constitutional:  NAD


Neurologic:  CN 2-12 intact


Cardiovascular:  RRR, no M/R/G


Respiratory:  CTA


Gastrointestinal:  S/NT/ND





Airway Exam


Mallampati Score:  Class II


MO:  full


Neck:  flexible


ROM:  full


Teeth:  intact


Dentures:  no upper, no lower





Anesthesia Pre-op A/P


Labs


see chart





Studies


Pre-op Studies:  EKG - NSR





Risk Assessment & Plan


Assessment:


ASA2


Plan:


GA with LMA PONV prevention


Status Change Before Surgery:  No





Pre-Antibiotics


Drug:  Clindamycin 600mg


Given Within 1 Hr of Incision:  Yes


Time Given:  09:50











Bj Hammond MD Sep 27, 2018 10:09

## 2018-09-27 NOTE — PRE-PROCEDURE NOTE/ATTESTATION
Pre-Procedure Note/Attestation


Complete Prior to Procedure


Planned Procedure:  right


Procedure Narrative:


replace breast implant from under muscle to over the muscle





Indications for Procedure


Pre-Operative Diagnosis:


status radiation for breast cancer and wanting symmetry





Attestation


I attest that I discussed the nature of the procedure; its benefits; risks and 

complications; and alternatives (and the risks and benefits of such alternatives

), prior to the procedure, with the patient (or the patient's legal 

representative).





I attest that, if there was a reasonable possibility of needing a blood 

transfusion, the patient (or the patient's legal representative) was given the 

[facility] standardized written summary, pursuant 

to the [place] Blood Safety Act ([place] Health and Safety Code # 1645, as 

amended).





I attest that I re-evaluated the patient just prior to the surgery and that 

there has been no change in the patient's H&P, except as documented below:











Live Amaya MD Sep 27, 2018 07:42

## 2018-09-27 NOTE — OPERATIVE NOTE - DICTATED
DATE OF OPERATION:  09/27/2018



SURGEON:  Live Amaya M.D.



PREOPERATIVE DIAGNOSIS:  Status post radiation to right breast status post

breast cancer with reconstruction to left and right breast under the

muscle with mastopexy done first vertical and then Wise pattern on the

left side and now desires because of the pressure of the muscle over the

implant to have the implant over the muscle and this is probably related

to the radiation and the contraction of the tissues.



PROCEDURE IN DETAIL:  The patient understood the risks, complications, and

alternative methods of treatment.  We began with the prep.  IV running in

the left upper extremity with general anesthesia given.  The patient was

injected with 1% Xylocaine and 1:200,000 of epinephrine using a 30-gauge

needle.  Injection was done vertically as well as horizontally through the

inferior border of the nipple-areolar complex.  We made the incision with

a 15 blade down to the capsule overlying the implant and dissected in the

tissue plane above that using first cutting cautery, noting that the

tissues are really scarred and then using Metzenbaum scissors, large

pocket was completely dissected further with the finger dissection and

then cutting cautery.  The posterior pocket was then cauterized up and

down to allow for the adhesion of the posterior pocket.  The saline

implant had been removed and we took a new 450 mL saline implant without

its air and then injected the saline into that area into the pocket with

all the air out of it and then placed the implant into the pocket, which

is the submammary pocket.  The breast tissue was noted to be pretty much

scarred and the skin pocket was scarred.  We did the peripheral dissection

and placed 450 mL into the implant.  We then placed a drain 10-B[place] into

the pocket to attach to a Wes-Milligan.  We then sutured using 3-0 Vicryl

sutures in the breast tissue and the dermis, multiple sutures and then ran

a 2 continuous 4-0 blue Prolene on the left and on the right of the

Wes-Milligan drain.  We then placed Betadine gel because she was allergic

to bacitracin.  We used the gel because it helped with the seal of the

drain.  We then placed 4x4s.  The patient was then extubated and

transferred to the postop recovery facility in satisfactory condition.

There was less than 10 mL blood loss.









  ______________________________________________

  Live Amaya M.D.





DR:  ANURADHA

D:  09/27/2018 11:02

T:  09/27/2018 16:07

JOB#:  [id number]

CC:

## 2018-09-28 ENCOUNTER — HOSPITAL ENCOUNTER (OUTPATIENT)
Dept: HOSPITAL 72 - WCC | Age: 53
LOS: 2 days | Discharge: HOME | End: 2018-09-30
Payer: SELF-PAY

## 2018-09-28 DIAGNOSIS — F41.9: ICD-10-CM

## 2018-09-28 DIAGNOSIS — C50.111: ICD-10-CM

## 2018-09-28 DIAGNOSIS — Z88.2: ICD-10-CM

## 2018-09-28 DIAGNOSIS — Z88.8: ICD-10-CM

## 2018-09-28 DIAGNOSIS — F32.9: ICD-10-CM

## 2018-09-28 DIAGNOSIS — Z88.0: ICD-10-CM

## 2018-09-28 DIAGNOSIS — L59.8: Primary | ICD-10-CM
